# Patient Record
Sex: FEMALE | Race: WHITE | NOT HISPANIC OR LATINO | Employment: PART TIME | URBAN - METROPOLITAN AREA
[De-identification: names, ages, dates, MRNs, and addresses within clinical notes are randomized per-mention and may not be internally consistent; named-entity substitution may affect disease eponyms.]

---

## 2019-07-03 ENCOUNTER — OFFICE VISIT (OUTPATIENT)
Dept: URGENT CARE | Facility: PHYSICIAN GROUP | Age: 54
End: 2019-07-03

## 2019-07-03 VITALS
BODY MASS INDEX: 37.61 KG/M2 | SYSTOLIC BLOOD PRESSURE: 124 MMHG | HEART RATE: 89 BPM | WEIGHT: 234 LBS | OXYGEN SATURATION: 92 % | HEIGHT: 66 IN | TEMPERATURE: 98.2 F | DIASTOLIC BLOOD PRESSURE: 78 MMHG

## 2019-07-03 DIAGNOSIS — H66.92 ACUTE OTITIS MEDIA, LEFT: ICD-10-CM

## 2019-07-03 PROCEDURE — 99204 OFFICE O/P NEW MOD 45 MIN: CPT | Performed by: FAMILY MEDICINE

## 2019-07-03 RX ORDER — ESCITALOPRAM OXALATE 10 MG/1
TABLET ORAL
COMMUNITY
Start: 2019-06-04

## 2019-07-03 RX ORDER — LANOLIN ALCOHOL/MO/W.PET/CERES
3 CREAM (GRAM) TOPICAL
COMMUNITY

## 2019-07-03 RX ORDER — ESCITALOPRAM OXALATE 20 MG/1
20 TABLET ORAL
COMMUNITY

## 2019-07-03 RX ORDER — LEVETIRACETAM 500 MG/1
TABLET ORAL
COMMUNITY
Start: 2019-01-16

## 2019-07-03 RX ORDER — AZITHROMYCIN 250 MG/1
TABLET, FILM COATED ORAL
Qty: 6 TAB | Refills: 0 | Status: SHIPPED | OUTPATIENT
Start: 2019-07-03

## 2019-07-03 ASSESSMENT — ENCOUNTER SYMPTOMS
HEADACHES: 1
EYE PAIN: 0
NAUSEA: 0
SHORTNESS OF BREATH: 0
MYALGIAS: 0
VOMITING: 0
DIZZINESS: 0
FEVER: 0
SORE THROAT: 1
CHILLS: 0

## 2019-07-03 NOTE — PROGRESS NOTES
Subjective:     Fallon Raygoza is a 54 y.o. female who presents for Otalgia (L ear pain, sore [id number] )       Otalgia    There is pain in the left ear. This is a new problem. The current episode started in the past 7 days. The problem occurs constantly. The problem has been gradually worsening. There has been no fever. The pain is moderate. Associated symptoms include headaches and a sore throat. Pertinent negatives include no ear discharge, rash or vomiting.   History reviewed. No pertinent past medical history.  Past Surgical History:   Procedure Laterality Date   • LAMINOTOMY       Social History     Social History   • Marital status: Single     Spouse name: N/A   • Number of children: N/A   • Years of education: N/A     Occupational History   • Not on file.     Social History Main Topics   • Smoking status: Never Smoker   • Smokeless tobacco: Never Used   • Alcohol use Yes      Comment: occ   • Drug use: No   • Sexual activity: Not on file     Other Topics Concern   • Not on file     Social History Narrative   • No narrative on file      Family History   Problem Relation Age of Onset   • Stroke Neg Hx    • Heart Disease Neg Hx     Review of Systems   Constitutional: Negative for chills and fever.   HENT: Positive for ear pain and sore throat. Negative for ear discharge.    Eyes: Negative for pain.   Respiratory: Negative for shortness of breath.    Cardiovascular: Negative for chest pain.   Gastrointestinal: Negative for nausea and vomiting.   Genitourinary: Negative for hematuria.   Musculoskeletal: Negative for myalgias.   Skin: Negative for rash.   Neurological: Positive for headaches. Negative for dizziness.     Allergies   Allergen Reactions   • Clavulanic Acid Swelling   • Codeine Swelling   • Iodine    • Penicillins Swelling     Patient states she is allergic to any medications containing penicillins   • Red Dye       Objective:   /78   Pulse 89   Temp 36.8 °C (98.2 °F) (Temporal)   Ht  "1.676 m (5' 6\")   Wt 106.1 kg (234 lb)   SpO2 92%   BMI 37.77 kg/m²   Physical Exam   Constitutional: She is oriented to person, place, and time. She appears well-developed and well-nourished. No distress.   HENT:   Head: Normocephalic and atraumatic.   Left Ear: There is tenderness. No drainage. A middle ear effusion is present.   Mouth/Throat: Oropharynx is clear and moist. No oropharyngeal exudate.   Eyes: Pupils are equal, round, and reactive to light. Conjunctivae and EOM are normal.   Cardiovascular: Normal rate and regular rhythm.    No murmur heard.  Pulmonary/Chest: Effort normal and breath sounds normal. No respiratory distress.   Abdominal: Soft. She exhibits no distension. There is no tenderness.   Neurological: She is alert and oriented to person, place, and time. She has normal reflexes. No sensory deficit.   Skin: Skin is warm and dry.   Psychiatric: She has a normal mood and affect.         Assessment/Plan:   Assessment    1. Acute otitis media, left  - azithromycin (ZITHROMAX) 250 MG Tab; Take 2 tablets by mouth on day one. Take one tablet by mouth the remaining days until gone  Dispense: 6 Tab; Refill: 0    Other orders  - escitalopram (LEXAPRO) 20 MG tablet; Take 20 mg by mouth.  - levETIRAcetam (KEPPRA) 500 MG Tab; TAKE 2 TABLETS BY MOUTH TWICE DAILY AS DIRECTED  - melatonin 3 MG Tab; Take 3 mg by mouth.  - escitalopram (LEXAPRO) 10 MG Tab; TAKE 1 TABLET BY MOUTH ONCE DAILY **NOTE**  30MG  TOTAL  DAILY  DOSE    Differential diagnosis, natural history, supportive care, and indications for immediate follow-up discussed.    "